# Patient Record
Sex: FEMALE
[De-identification: names, ages, dates, MRNs, and addresses within clinical notes are randomized per-mention and may not be internally consistent; named-entity substitution may affect disease eponyms.]

---

## 2020-08-16 ENCOUNTER — HOSPITAL ENCOUNTER (EMERGENCY)
Dept: HOSPITAL 18 - NAV ERS | Age: 65
Discharge: HOME | End: 2020-08-16
Payer: SELF-PAY

## 2020-08-16 DIAGNOSIS — K21.9: ICD-10-CM

## 2020-08-16 DIAGNOSIS — S13.9XXA: Primary | ICD-10-CM
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
## 2020-08-16 DIAGNOSIS — S00.03XA: ICD-10-CM

## 2020-08-16 DIAGNOSIS — Z79.899: ICD-10-CM

## 2020-08-16 DIAGNOSIS — Z87.891: ICD-10-CM

## 2020-08-16 PROCEDURE — 72125 CT NECK SPINE W/O DYE: CPT

## 2020-08-16 PROCEDURE — 70450 CT HEAD/BRAIN W/O DYE: CPT

## 2020-08-16 NOTE — CT
Exam: CT cervical spine without contrast



HISTORY: Trauma. Pain.



COMPARISON: None



FINDINGS: 

No craniocervical dissociation. Appropriate alignment of the lateral masses of C1 and C2. Intact odon
toid process

Appropriate alignment of the facets.

Straightening of cervical lordosis may be due to patient position, muscle spasm or cervical collar.

Soft tissue neck structures: No mass, lymphadenopathy or hematoma. No prevertebral soft tissue swelli
ng.

Upper mediastinum and lung apices: Chronic changes along parenchyma with bilateral apical pleural thi
ckening.

Central spinal canal: There are varying degrees of significant central canal stenosis and significant
 neural foraminal narrowing on the basis of degenerative change. Technique limits evaluation

Vertebral bodies: Cervical spine vertebral body height is maintained. No fracture. Remote fracture in
volving the C7 spinous process.





IMPRESSION:



1. No fracture

2. Straightening of cervical lordosis as above. Cervical spine MRI there is concern for ligamentous i
njury.



Reported By: Shirin Pang 

Electronically Signed:  8/16/2020 12:53 PM

## 2020-08-16 NOTE — CT
Exam: Head CT without contrast





HISTORY: Pain. Trauma.



COMPARISON: none





FINDINGS:

Hemorrhage: No intraparenchymal hemorrhage or extra-axial hematoma.



Brain parenchyma: Cortical gray-white matter differentiation is preserved. No mass effect or midline 
shift. Basilar cisterns are patent.

Ventricular system: Ventricles and sulci are patent and symmetric.

Calvarium: Intact.

Sinuses and mastoid air cells: Adequate aeration.



IMPRESSION:

No intracranial post traumatic sequelae.







Reported By: Shirin Pang 

Electronically Signed:  8/16/2020 12:51 PM

## 2020-11-23 ENCOUNTER — HOSPITAL ENCOUNTER (OUTPATIENT)
Dept: HOSPITAL 92 - BICRAD | Age: 65
Discharge: HOME | End: 2020-11-23
Attending: INTERNAL MEDICINE
Payer: COMMERCIAL

## 2020-11-23 DIAGNOSIS — M18.11: ICD-10-CM

## 2020-11-23 DIAGNOSIS — M05.79: Primary | ICD-10-CM

## 2020-11-23 PROCEDURE — 71046 X-RAY EXAM CHEST 2 VIEWS: CPT

## 2020-12-14 ENCOUNTER — HOSPITAL ENCOUNTER (OUTPATIENT)
Dept: HOSPITAL 92 - BICMAMMO | Age: 65
Discharge: HOME | End: 2020-12-14
Attending: INTERNAL MEDICINE
Payer: COMMERCIAL

## 2020-12-14 DIAGNOSIS — M05.79: ICD-10-CM

## 2020-12-14 DIAGNOSIS — M81.0: Primary | ICD-10-CM

## 2020-12-14 PROCEDURE — 77080 DXA BONE DENSITY AXIAL: CPT

## 2023-12-15 ENCOUNTER — HOSPITAL ENCOUNTER (OUTPATIENT)
Dept: HOSPITAL 92 - BICMAMMO | Age: 68
Discharge: HOME | End: 2023-12-15
Payer: MEDICARE

## 2023-12-15 DIAGNOSIS — Z80.3: ICD-10-CM

## 2023-12-15 DIAGNOSIS — Z13.820: ICD-10-CM

## 2023-12-15 DIAGNOSIS — Z12.31: Primary | ICD-10-CM

## 2023-12-15 PROCEDURE — 77080 DXA BONE DENSITY AXIAL: CPT

## 2023-12-15 PROCEDURE — 77067 SCR MAMMO BI INCL CAD: CPT
